# Patient Record
Sex: MALE | Race: WHITE | URBAN - NONMETROPOLITAN AREA
[De-identification: names, ages, dates, MRNs, and addresses within clinical notes are randomized per-mention and may not be internally consistent; named-entity substitution may affect disease eponyms.]

---

## 2023-02-14 ENCOUNTER — OFFICE (OUTPATIENT)
Dept: URBAN - NONMETROPOLITAN AREA CLINIC 1 | Facility: CLINIC | Age: 63
End: 2023-02-14

## 2023-02-14 VITALS
OXYGEN SATURATION: 98 % | HEIGHT: 75 IN | HEART RATE: 92 BPM | DIASTOLIC BLOOD PRESSURE: 82 MMHG | WEIGHT: 209 LBS | SYSTOLIC BLOOD PRESSURE: 129 MMHG

## 2023-02-14 DIAGNOSIS — K44.9 DIAPHRAGMATIC HERNIA WITHOUT OBSTRUCTION OR GANGRENE: ICD-10-CM

## 2023-02-14 DIAGNOSIS — Z86.010 PERSONAL HISTORY OF COLONIC POLYPS: ICD-10-CM

## 2023-02-14 DIAGNOSIS — Z87.19 PERSONAL HISTORY OF OTHER DISEASES OF THE DIGESTIVE SYSTEM: ICD-10-CM

## 2023-02-14 PROCEDURE — 99204 OFFICE O/P NEW MOD 45 MIN: CPT | Performed by: INTERNAL MEDICINE

## 2023-06-21 ENCOUNTER — ON CAMPUS - OUTPATIENT (OUTPATIENT)
Dept: URBAN - NONMETROPOLITAN AREA HOSPITAL 34 | Facility: HOSPITAL | Age: 63
End: 2023-06-21

## 2023-06-21 DIAGNOSIS — K31.89 OTHER DISEASES OF STOMACH AND DUODENUM: ICD-10-CM

## 2023-06-21 DIAGNOSIS — D12.3 BENIGN NEOPLASM OF TRANSVERSE COLON: ICD-10-CM

## 2023-06-21 DIAGNOSIS — Z09 ENCOUNTER FOR FOLLOW-UP EXAMINATION AFTER COMPLETED TREATMEN: ICD-10-CM

## 2023-06-21 DIAGNOSIS — D12.5 BENIGN NEOPLASM OF SIGMOID COLON: ICD-10-CM

## 2023-06-21 DIAGNOSIS — D12.2 BENIGN NEOPLASM OF ASCENDING COLON: ICD-10-CM

## 2023-06-21 DIAGNOSIS — K21.9 GASTRO-ESOPHAGEAL REFLUX DISEASE WITHOUT ESOPHAGITIS: ICD-10-CM

## 2023-06-21 DIAGNOSIS — Z86.010 PERSONAL HISTORY OF COLONIC POLYPS: ICD-10-CM

## 2023-06-21 DIAGNOSIS — D12.4 BENIGN NEOPLASM OF DESCENDING COLON: ICD-10-CM

## 2023-06-21 PROCEDURE — 43239 EGD BIOPSY SINGLE/MULTIPLE: CPT | Mod: 51 | Performed by: INTERNAL MEDICINE

## 2023-06-21 PROCEDURE — 45385 COLONOSCOPY W/LESION REMOVAL: CPT | Performed by: INTERNAL MEDICINE

## 2025-01-30 ENCOUNTER — OFFICE (OUTPATIENT)
Dept: URBAN - NONMETROPOLITAN AREA CLINIC 1 | Facility: CLINIC | Age: 65
End: 2025-01-30
Payer: COMMERCIAL

## 2025-01-30 VITALS
HEART RATE: 71 BPM | HEIGHT: 75 IN | WEIGHT: 221 LBS | DIASTOLIC BLOOD PRESSURE: 74 MMHG | SYSTOLIC BLOOD PRESSURE: 145 MMHG

## 2025-01-30 DIAGNOSIS — Z09 ENCOUNTER FOR FOLLOW-UP EXAMINATION AFTER COMPLETED TREATMEN: ICD-10-CM

## 2025-01-30 DIAGNOSIS — K22.70 BARRETT'S ESOPHAGUS WITHOUT DYSPLASIA: ICD-10-CM

## 2025-01-30 DIAGNOSIS — Z86.0101 PERSONAL HISTORY OF ADENOMATOUS AND SERRATED COLON POLYPS: ICD-10-CM

## 2025-01-30 DIAGNOSIS — R09.89 OTHER SPECIFIED SYMPTOMS AND SIGNS INVOLVING THE CIRCULATORY: ICD-10-CM

## 2025-01-30 DIAGNOSIS — K21.9 GASTRO-ESOPHAGEAL REFLUX DISEASE WITHOUT ESOPHAGITIS: ICD-10-CM

## 2025-01-30 PROCEDURE — 99213 OFFICE O/P EST LOW 20 MIN: CPT | Performed by: NURSE PRACTITIONER

## 2025-01-30 NOTE — SERVICENOTES
Advised continuation of ppi therapy and adding Pepcid qhs for reflux, however, he declines. 
Can proceed with esophageal manometry to r/o out esophageal motility disorder and assess UES for his persistent Globus sensation-he wants to wait and have this done here with our MDs, but I did tell him we do not have anyone to interpret these studies at this time, and I do not know when we will have a MD able to do so, that I can refer him to [place] or [place] instead, however, he declines
Can consider nortriptyline low dosage qd in future as well- will discuss with Dr. Cyr for recommendations
Gerd and swallowing precautions discussed
EGD -Anton's surveillance due 2026
Colonoscopy surveillance due 2026.

## 2025-01-30 NOTE — SERVICEHPINOTES
Patient presents to the clinic today for globus.  He has a history of Anton's esophagus with last EGD 2023 outlined below.  He takes Nexium 40mg po qd with optimal control of GERD.  He c/o globus sensation but denies any cervical or esophageal dysphagia with food/liquids/pills.  He tells me he has tried Pepcid otc without relief.  His pcp started him on Baclofen recently as well, but he cannot tell a difference.  He also went to Dr. Gastelum ENT 9/2024 scope in office -scope showed reflux. continue PPI-diagnosed with globus.  He has abdominal bloating/gas and pressure in throat after eating a larger meal such as supper.  He states the antacids do not help him at all and has tried and failed this in the past.  Reports globus sensation as pressure at cervical area of throat at times.  He denies any abdominal  pain, n/v, unintentional weight loss, choking, or difficulty breathing to speak of.   br
br   Anton's 2023- repeat in 3 years
br 
EGD by Dr. Cyr 6/2023
bresophagus Z-line irregular few islands of salmon-colored mucosa proximal to the Z-line consistent with Anton's biopsies obtained otherwise no esophageal esophagitis, varices, mass, or stricture GE junction normal no hiatal hernia, stomach large patch of moderate erythema in the antrum consistent with focal gastritis biopsies, pylorus normal and patent, duodenal normal br 
br Colonoscopy 6/2023 by Dr. Cyr 
br
scope to TI, prep was good.br Pathology- gastropathy negative for intestinal metaplasia and negative for H pylori, distal esophagus Barretts negative for dysplasia, ascending colon polyp TVA multiple fragmented, transverse colon polyps TA, descending polyp TA, sigmoid polyp TA-repeat in 3 years. br br
Per OV Dr. Cyr 2/2023
brPatient is a 62-year-old male with a history of chronic GERD, Anton's esophagus, and numerous high risk colon adenomas in the past presenting to the clinic with complaints of trouble swallowing. He states that he feels like there is something in the back of his throat all the time and whenever he swallows he feels it moved. He denies any trouble eating and states the food goes down without any difficulty. He does not have any problem drinking liquids. He mostly notices the sensation in the back of his throat when he is not eating or drinking. He does have a history of chronic GERD and had been on lansoprazole for many years. He had a flareup around the holidays with frequent heartburn and his PCP changed him to Nexium 40 mg p.o. twice daily. He reports this is worked very well to control his GERD symptoms but has not improved his globus sensation. He states the symptoms of globus sensation started around June 2022. He denies any unintentional weight loss. He had a prior history of Anton's esophagus noted on EGD in 2015 with biopsy showing low-grade dysplasia but repeat EGDs have not shown the presence of Anton's esophagus including his most recent EGD in 2019 as detailed below. He did also undergo colonoscopy in 2019 and 4 small adenomatous polyps were removed at that time. He denies any recent change in bowel habits or blood in the stools.GI history:brColonoscopy in 1/2012 - 2 small adenomas removed o/w NL to cecumEGD on 1/12/12 - 2 cm HH o/w NL, NL esophageal biopsiesbrEGD in 11/2008 - 2 cm HH o/w NL, distal esoph. Bx revealed reflux changes.Short segment Anton's esophagus.brEGD in 7/2015 - 5 mm of Anton's with no dysplasia on Bx, 2 cm hiatal herniao/w NL. NERD, and Other GI history:br1/2/15 EGD: hiatal hernia, nonerosive GERD, biopsy: low grade dysplasia Anton's esophagusbr1/6/2015 Abdominal ultrasound: normal.Large TV adenoma removed from rectum with transanal resection in 1/2001brAdenomatous polyps removed on colonoscopy in 2005, 2008, 2012.EGD and colonoscopy by Dr. Araujo 06/30/2016[1] The cecum appeared to be normal. Four sessile polyps, measuring between 5 and 10 mm in size, were found in the descending colon and rectum. All polyps were completely removed by snare cautery polypectomy. The polyps were retrieved. Six sessile polyps, measuring 6-13 mm in size, were found in the ascending colon and proximal transverse colon. All polyps were completely removed by snare cautery polypectomy. The polyps were retrieved. Otherwise, the colon appeared to be normal. There was no evidence of colitis, diverticula, tumors, or stenosis in the colon.br[2] 1. DISTAL ESOPHAGUS, ENDOSCOPIC BIOPSY:brBenign squamocolumnar junctional mucosa with mild reflux-associated changes.brMultifocal intestinal (Anton's) metaplasia is present.brNegative for dysplasia.2. ASCENDING COLON POLYPs, ENDOSCOPIC BIOPSY:brTubular adenoma(s), multiple/fragmented.3. DESCENDING COLON POLYPs, ENDOSCOPIC BIOPSY:brTubulovillous adenoma(s), multiple/fragmented.br[3] repeat EGD and colonoscopy in 3 yearsPatient is a family history of colorectal cancer with his paternal uncle. He has had numerous family members with colon polyps including his mother and father7/26/2019 EGD/colonoscopy ([place]):brFindings:brEsophagus: Irregular Z-line = 43 cm. The esophageal mucosa is normal with no esophagitis, Anton's esophagus, varices, mass, or stricture.brGE Junction: Small less than 1 centimeter hiatal herniabrStomach: Normal with no ulcer, mass, or erosion.brPylorus: Normal and patentbrDuodenum: Normal with no ulcer, duodenitis, mass, AVM, or stricture.Findings:brThe ileum is normal. 2 sessile polyps measuring 7 and 9 millimeters in diameter were both completely removed with snare cautery polypectomy from the cecum and ascending colon. A 9 millimeter sessile polyp was also completely removed with snare cautery polypectomy from the distal rectum. The colonic mucosal vascular pattern, folds, and color are otherwise normal with no large polyps, masses, ulcerations, diverticulosis, or colitis. Retroflex exam and anal inspection were unremarkable except for small internal hemorrhoids.Final Pathologic Diagnosis:br1. CECUM, BIOPSY:brTubular adenomas (adenomatous polyps) of the cecum.br2. RECTUM, BIOPSY:brTubular adenoma (adenomatous polyp) of the rectum.